# Patient Record
Sex: MALE | Race: WHITE | NOT HISPANIC OR LATINO | Employment: UNEMPLOYED | URBAN - NONMETROPOLITAN AREA
[De-identification: names, ages, dates, MRNs, and addresses within clinical notes are randomized per-mention and may not be internally consistent; named-entity substitution may affect disease eponyms.]

---

## 2017-07-11 ENCOUNTER — OFFICE VISIT (OUTPATIENT)
Dept: ORTHOPEDIC SURGERY | Facility: CLINIC | Age: 47
End: 2017-07-11

## 2017-07-11 VITALS — BODY MASS INDEX: 23.1 KG/M2 | HEIGHT: 74 IN | WEIGHT: 180 LBS

## 2017-07-11 DIAGNOSIS — M54.2 CERVICALGIA: Primary | ICD-10-CM

## 2017-07-11 DIAGNOSIS — M79.2 RADICULAR PAIN OF LEFT UPPER EXTREMITY: ICD-10-CM

## 2017-07-11 PROCEDURE — 99203 OFFICE O/P NEW LOW 30 MIN: CPT | Performed by: NURSE PRACTITIONER

## 2017-07-11 PROCEDURE — 96372 THER/PROPH/DIAG INJ SC/IM: CPT | Performed by: NURSE PRACTITIONER

## 2017-07-11 RX ORDER — TRIAMCINOLONE ACETONIDE 40 MG/ML
60 INJECTION, SUSPENSION INTRA-ARTICULAR; INTRAMUSCULAR ONCE
Status: COMPLETED | OUTPATIENT
Start: 2017-07-11 | End: 2017-07-11

## 2017-07-11 RX ORDER — FEXOFENADINE HCL 180 MG/1
TABLET ORAL
Refills: 1 | COMMUNITY
Start: 2017-06-07

## 2017-07-11 RX ORDER — CYCLOBENZAPRINE HCL 10 MG
TABLET ORAL
COMMUNITY
Start: 2017-07-08 | End: 2018-06-06

## 2017-07-11 RX ORDER — FLUTICASONE PROPIONATE 50 UG/1
SPRAY, METERED NASAL
Refills: 5 | COMMUNITY
Start: 2017-06-12

## 2017-07-11 RX ORDER — MELATONIN
Refills: 3 | COMMUNITY
Start: 2017-06-12

## 2017-07-11 RX ORDER — METHYLPREDNISOLONE 4 MG/1
1 TABLET ORAL DAILY
Qty: 21 TABLET | Refills: 0 | Status: SHIPPED | OUTPATIENT
Start: 2017-07-11 | End: 2018-05-21

## 2017-07-11 RX ORDER — NAPROXEN 500 MG/1
TABLET ORAL
COMMUNITY
Start: 2017-07-08 | End: 2018-05-21

## 2017-07-11 RX ORDER — KETOROLAC TROMETHAMINE 30 MG/ML
60 INJECTION, SOLUTION INTRAMUSCULAR; INTRAVENOUS ONCE
Status: COMPLETED | OUTPATIENT
Start: 2017-07-11 | End: 2017-07-11

## 2017-07-11 RX ORDER — GEMFIBROZIL 600 MG/1
TABLET, FILM COATED ORAL
COMMUNITY
Start: 2017-07-08

## 2017-07-11 RX ADMIN — TRIAMCINOLONE ACETONIDE 60 MG: 40 INJECTION, SUSPENSION INTRA-ARTICULAR; INTRAMUSCULAR at 15:30

## 2017-07-11 RX ADMIN — KETOROLAC TROMETHAMINE 60 MG: 30 INJECTION, SOLUTION INTRAMUSCULAR; INTRAVENOUS at 15:30

## 2017-07-11 NOTE — PROGRESS NOTES
Bertram Toscano is a 47 y.o. male   Primary provider:  LISA Ho       Chief Complaint   Patient presents with   • Cervical Spine - Establish Care     Xray today       HISTORY OF PRESENT ILLNESS:    HPI Comments: Complains of neck pain with pain that radiates down into his left arm with numbness and tingling.  Similar episode last year with pain that radiated into bilateral arms that spontaneously resolved.  Denies any known injury.  Pain is worse with movement of his neck.  Pain is described as burning, stinging.  Pain improves when he raises his left arm to his head. No improvement with NSAIDs.     Neck Pain    This is a recurrent problem. Episode onset: 2 months. The problem occurs constantly. The problem has been gradually worsening. The pain is associated with nothing. The pain is present in the left side. The quality of the pain is described as aching and burning. The pain is at a severity of 5/10. The pain is mild. The symptoms are aggravated by position. Associated symptoms include numbness, photophobia, tingling and weakness. He has tried NSAIDs, oral narcotics and muscle relaxants for the symptoms. The treatment provided no relief.        CONCURRENT MEDICAL HISTORY:    Past Medical History:   Diagnosis Date   • Asthma        No Known Allergies      Current Outpatient Prescriptions:   •  BREO ELLIPTA 200-25 MCG/INH aerosol powder , INAHLE 1 PUFF ONCE DAILY, Disp: , Rfl: 3  •  cholecalciferol (VITAMIN D3) 1000 UNITS tablet, TAKE 1 TABLET BY MOUTH EVERY MORNING, Disp: , Rfl: 3  •  cyclobenzaprine (FLEXERIL) 10 MG tablet, , Disp: , Rfl:   •  fexofenadine (ALLEGRA) 180 MG tablet, TAKE 1 TABLET BY MOUTH ONCE DAILY, Disp: , Rfl: 1  •  FLONASE ALLERGY RELIEF 50 MCG/ACT nasal spray, USE 1 SPRAY IN EACH NOSTRIL DAILY, Disp: , Rfl: 5  •  gemfibrozil (LOPID) 600 MG tablet, , Disp: , Rfl:   •  naproxen (NAPROSYN) 500 MG tablet, , Disp: , Rfl:   •  MethylPREDNISolone (MEDROL, NOBLE,) 4 MG tablet, Take 1  "tablet by mouth Daily. Use as directed by package instructions, Disp: 21 tablet, Rfl: 0  No current facility-administered medications for this visit.     Past Surgical History:   Procedure Laterality Date   • PROSTATE BIOPSY         Family History   Problem Relation Age of Onset   • Heart disease Mother    • Hypertension Father    • Heart disease Father    • Diabetes Father         Social History     Social History   • Marital status: Single     Spouse name: N/A   • Number of children: N/A   • Years of education: N/A     Occupational History   • Not on file.     Social History Main Topics   • Smoking status: Current Every Day Smoker     Packs/day: 1.00     Types: Cigarettes   • Smokeless tobacco: Not on file   • Alcohol use Yes      Comment: light    • Drug use: No   • Sexual activity: Not on file     Other Topics Concern   • Not on file     Social History Narrative   • No narrative on file        Review of Systems   Eyes: Positive for photophobia.   Musculoskeletal: Positive for arthralgias, back pain and neck pain.        Left arm pain.    Neurological: Positive for tingling, weakness and numbness.   All other systems reviewed and are negative.      PHYSICAL EXAMINATION:       Ht 74\" (188 cm)  Wt 180 lb (81.6 kg)  BMI 23.11 kg/m2    Physical Exam   Constitutional: He is oriented to person, place, and time. Vital signs are normal. He appears well-developed and well-nourished.   HENT:   Head: Normocephalic.   Pulmonary/Chest: Effort normal. No respiratory distress.   Abdominal: Soft. He exhibits no distension.   Neurological: He is alert and oriented to person, place, and time. GCS eye subscore is 4. GCS verbal subscore is 5. GCS motor subscore is 6.   Skin: Skin is warm, dry and intact.   Psychiatric: He has a normal mood and affect. His speech is normal and behavior is normal. Judgment and thought content normal. Cognition and memory are normal.   Vitals reviewed.      GAIT:     [x]  Normal  []  " Antalgic    Assistive device: [x]  None  []  Walker     []  Crutches  []  Cane     []  Wheelchair  []  Stretcher    Back Exam     Tenderness   The patient is experiencing tenderness in the cervical.    Range of Motion   Extension: abnormal   Flexion: abnormal   Lateral Bend Right: abnormal   Lateral Bend Left: normal   Rotation Right: abnormal   Rotation Left: normal     Muscle Strength   The patient has normal back strength.    Tests   Straight leg raise right: negative  Straight leg raise left: negative    Reflexes   Biceps: normal    Other   Sensation: normal  Gait: normal   Erythema: no back redness  Scars: absent    Comments:  Increased pain with flexion, extension and right rotation of neck.               Xr Spine Cervical 2 Or 3 View    Result Date: 7/11/2017  Narrative: 2 views of the cervical spine reveal degenerative changes at the levels of C5-C6 and C6-C7 with arthritic changes and anterior spurring.  No acute radiologic abnormalities are noted at this time.07/11/17 at 3:44 PM by LISA Kim       ASSESSMENT:    Diagnoses and all orders for this visit:    Cervicalgia  -     XR Spine Cervical 2 or 3 View  -     MRI Cervical Spine Without Contrast; Future  -     triamcinolone acetonide (KENALOG-40) injection 60 mg; Inject 1.5 mL into the shoulder, thigh, or buttocks 1 (One) Time.  -     Ketorolac (Toradol) injection 60 mg.; Inject 2 mL into shoulder, thigh or buttocks 1 (One) Time.     Radicular pain of left upper extremity  -     MRI Cervical Spine Without Contrast; Future  -     triamcinolone acetonide (KENALOG-40) injection 60 mg; Inject 1.5 mL into the shoulder, thigh, or buttocks 1 (One) Time.        -     Ketorolac (Toradol) injection 60 mg.; Inject 2 mL into shoulder, thigh or buttocks 1 (One) Time.     Other orders  -     cyclobenzaprine (FLEXERIL) 10 MG tablet;   -     naproxen (NAPROSYN) 500 MG tablet;   -     MethylPREDNISolone (MEDROL, NOBLE,) 4 MG tablet; Take 1 tablet by mouth Daily.  Use as directed by package instructions    PLAN    X-rays of cervical spine reviewed today.  Recommend MRI of cervical spine based on recurrent pain with severe radicular symptoms down the left arm to rule out disc protrusion and nerve compression.  Recommend Medrol Dosepak.  Recommend IM injections of Toradol and Kenalog today in office for pain management.  Continue naproxen and Flexeril, as previously prescribed.  Recommend activity modification, based on pain.  Follow-up after MRI.    Return after MRI.    Jennifer Piña, APRN

## 2017-07-26 ENCOUNTER — HOSPITAL ENCOUNTER (OUTPATIENT)
Dept: MRI IMAGING | Facility: HOSPITAL | Age: 47
Discharge: HOME | End: 2017-07-26

## 2017-07-26 DIAGNOSIS — M54.2 CERVICALGIA: ICD-10-CM

## 2017-07-26 DIAGNOSIS — M79.2 RADICULAR PAIN OF LEFT UPPER EXTREMITY: ICD-10-CM

## 2017-08-02 ENCOUNTER — APPOINTMENT (OUTPATIENT)
Dept: MRI IMAGING | Facility: HOSPITAL | Age: 47
End: 2017-08-02

## 2017-08-11 ENCOUNTER — APPOINTMENT (OUTPATIENT)
Dept: MRI IMAGING | Facility: HOSPITAL | Age: 47
End: 2017-08-11

## 2017-08-24 ENCOUNTER — HOSPITAL ENCOUNTER (OUTPATIENT)
Dept: MRI IMAGING | Facility: HOSPITAL | Age: 47
Discharge: HOME | End: 2017-08-24
Admitting: NURSE PRACTITIONER

## 2017-08-24 PROCEDURE — 72141 MRI NECK SPINE W/O DYE: CPT

## 2017-08-28 ENCOUNTER — OFFICE VISIT (OUTPATIENT)
Dept: ORTHOPEDIC SURGERY | Facility: CLINIC | Age: 47
End: 2017-08-28

## 2017-08-28 VITALS — BODY MASS INDEX: 23.1 KG/M2 | HEIGHT: 74 IN | WEIGHT: 180 LBS

## 2017-08-28 DIAGNOSIS — M54.2 CERVICALGIA: ICD-10-CM

## 2017-08-28 DIAGNOSIS — M79.2 RADICULAR PAIN OF LEFT UPPER EXTREMITY: ICD-10-CM

## 2017-08-28 DIAGNOSIS — M50.30 DEGENERATIVE DISC DISEASE, CERVICAL: Primary | ICD-10-CM

## 2017-08-28 PROCEDURE — 99214 OFFICE O/P EST MOD 30 MIN: CPT | Performed by: NURSE PRACTITIONER

## 2017-08-28 NOTE — PROGRESS NOTES
"Bertram Toscano is a 47 y.o. male returns for     Chief Complaint   Patient presents with   • Cervical Spine - Follow-up   • Results     MRI       HISTORY OF PRESENT ILLNESS: Presents to office for recheck of neck pain and MRI results of cervical spine. Pain improved after injections and oral course of steroids given at last visit but is gradually returning. Patient continues to have numbness of the left arm intermittently.        CONCURRENT MEDICAL HISTORY:    Past Medical History:   Diagnosis Date   • Asthma        No Known Allergies      Current Outpatient Prescriptions:   •  BREO ELLIPTA 100-25 MCG/INH aerosol powder , , Disp: , Rfl:   •  BREO ELLIPTA 200-25 MCG/INH aerosol powder , INAHLE 1 PUFF ONCE DAILY, Disp: , Rfl: 3  •  cholecalciferol (VITAMIN D3) 1000 UNITS tablet, TAKE 1 TABLET BY MOUTH EVERY MORNING, Disp: , Rfl: 3  •  cyclobenzaprine (FLEXERIL) 10 MG tablet, , Disp: , Rfl:   •  fexofenadine (ALLEGRA) 180 MG tablet, TAKE 1 TABLET BY MOUTH ONCE DAILY, Disp: , Rfl: 1  •  FLONASE ALLERGY RELIEF 50 MCG/ACT nasal spray, USE 1 SPRAY IN EACH NOSTRIL DAILY, Disp: , Rfl: 5  •  gemfibrozil (LOPID) 600 MG tablet, , Disp: , Rfl:   •  MethylPREDNISolone (MEDROL, NOBLE,) 4 MG tablet, Take 1 tablet by mouth Daily. Use as directed by package instructions, Disp: 21 tablet, Rfl: 0  •  naproxen (NAPROSYN) 500 MG tablet, , Disp: , Rfl:     Past Surgical History:   Procedure Laterality Date   • PROSTATE BIOPSY         ROS  No fevers or chills.  No chest pain or shortness of air.  No GI or  disturbances. Neck pain. Left arm numbness.     PHYSICAL EXAMINATION:       Ht 74\" (188 cm)  Wt 180 lb (81.6 kg)  BMI 23.11 kg/m2    Physical Exam   Constitutional: He is oriented to person, place, and time. Vital signs are normal. He appears well-developed and well-nourished.   HENT:   Head: Normocephalic.   Pulmonary/Chest: Effort normal. No respiratory distress.   Abdominal: Soft. He exhibits no distension.   Neurological: He is " alert and oriented to person, place, and time. GCS eye subscore is 4. GCS verbal subscore is 5. GCS motor subscore is 6.   Skin: Skin is warm, dry and intact.   Psychiatric: He has a normal mood and affect. His speech is normal and behavior is normal. Judgment and thought content normal. Cognition and memory are normal.   Vitals reviewed.      GAIT:     [x]  Normal  []  Antalgic    Assistive device: [x]  None  []  Walker     []  Crutches  []  Cane     []  Wheelchair  []  Stretcher    Back Exam     Tenderness   The patient is experiencing tenderness in the cervical.    Range of Motion   Extension: abnormal Back extension: cervical    Flexion: abnormal Back flexion: cervical.   Rotation Right: abnormal Back rotation right: cervical.   Rotation Left: normal     Muscle Strength   The patient has normal back strength.    Tests   Straight leg raise right: negative  Straight leg raise left: negative    Other   Sensation: decreased (left forearm)  Gait: normal   Erythema: no back redness  Scars: absent    Comments:  Increased pain with flexion, extension and right rotation of neck.             Mri Cervical Spine Without Contrast    Result Date: 8/24/2017  Narrative: EXAMINATION: MRI CERVICAL SPINE WO CONTRAST-  8/24/2017 12:48 PM CST  HISTORY: Cervicalgia. Radicular pain of left upper extremity  COMPARISON:None  TECHNIQUE:Multiplanar MR imaging of the cervical spine was performed.  The patient was very claustrophobic. Image quality related to patient motion observed.  FINDINGS:  There are no cord signal abnormalities.  There is decreased T1 signal identified diffusely in the spine. Question red cell conversion of bone marrow. Correlate for anemia or tobacco use.  DISC SPACE LEVELS:  C2-C3: Normal hydration observed within the disc. The bony spinal canal and foramina are capacious without stenosis. There is no disc bulge or protrusion.  C3-C4: Spondylosis changes identified with mild anterior and posterior osteophyte  formation. There is broad-based posterior osteophyte and disc complex with uncinate hypertrophy. There is relative mild narrowing of the bony spinal canal with mild ventral thecal sac and cord flattening with mild canal stenosis. There is mild to moderate bilateral foraminal narrowing. There are no focal disc protrusions or herniations. There is linear T2 signal hyperintensity extending into the bulging disc material compatible with an annular tear.  C4-C5: There is desiccation dysmature loss of normal T2 signal. There is diffuse disc bulging with mild central protrusion. There is mild thecal sac cord flattening particularly centrally without significant canal stenosis. There is uncinate hypertrophy with mild to moderate left-sided foraminal narrowing. The right foramina is patent without significant stenosis.  C5-C6: There is desiccation of disc material with disc space narrowing. There is type II Modic endplate changes. There is anterior and posterior osteophyte formation. There is broad-based posterior osteophyte and disc complex more prominent right paracentrally. There is resulting canal and thecal sac deformity again more prominent right paracentrally with mild canal stenosis. Uncinate hypertrophy. Mild bilateral foraminal narrowing observed greater on the left.  C6-C7: There is disc degeneration with desiccation of disc material with disc space narrowing. There is anterior posterior osteophyte formation. There is broad-based posterior osteophyte and disc complex with broad-based disc protrusion versus disc bulging. There is mild thecal sac narrowing cord flattening without significant canal stenosis. There is uncinate hypertrophy with moderate left and mild right-sided foraminal narrowing.      Impression: 1. Multilevel disc degeneration and spondylosis with relative canal or foraminal narrowing, described above in detail. This report was finalized on 08/24/2017 13:57 by Dr. Wisam Galarza  MD.        ASSESSMENT:    Diagnoses and all orders for this visit:    Cervicalgia    Radicular pain of left upper extremity    Degenerative disc disease, cervical    PLAN    MRI of cervical spine reviewed and results discussed with patient. Discussed treatment options today with patient, including referral to a spine surgeon or neurosurgeon. Patient states he would like to avoid surgery if at all possible. Recommend referral to pain management for cervical epidural injections. Patient is in agreement with this. Recommend to continue Naproxen and Flexeril as needed for pain. Continue with activity modification as tolerated, based on pain. Specifically, patient is instructed to avoid any heavy lifting, pulling or tugging. Patient verbalized understanding. Follow up 4-6 weeks post-injection.     Return 4-6 weeks post injection for recheck.    Jennifer Piña APRN

## 2017-08-31 DIAGNOSIS — M79.2 RADICULAR PAIN OF LEFT UPPER EXTREMITY: ICD-10-CM

## 2017-08-31 DIAGNOSIS — M54.2 CERVICALGIA: ICD-10-CM

## 2017-09-11 ENCOUNTER — HOSPITAL ENCOUNTER (OUTPATIENT)
Dept: INTERVENTIONAL RADIOLOGY/VASCULAR | Facility: HOSPITAL | Age: 47
Discharge: HOME | End: 2017-09-11
Attending: PAIN MEDICINE | Admitting: PAIN MEDICINE

## 2017-09-11 VITALS
DIASTOLIC BLOOD PRESSURE: 88 MMHG | RESPIRATION RATE: 18 BRPM | SYSTOLIC BLOOD PRESSURE: 150 MMHG | OXYGEN SATURATION: 98 % | HEART RATE: 80 BPM

## 2017-09-11 DIAGNOSIS — M54.2 CERVICALGIA: ICD-10-CM

## 2017-09-11 DIAGNOSIS — M79.2 RADICULAR PAIN OF LEFT UPPER EXTREMITY: ICD-10-CM

## 2017-09-11 PROCEDURE — 25010000002 METHYLPREDNISOLONE PER 80 MG: Performed by: PAIN MEDICINE

## 2017-09-11 PROCEDURE — 62321 NJX INTERLAMINAR CRV/THRC: CPT | Performed by: PAIN MEDICINE

## 2017-09-11 PROCEDURE — 0 IOPAMIDOL 41 % SOLUTION: Performed by: PAIN MEDICINE

## 2017-09-11 RX ORDER — METHYLPREDNISOLONE ACETATE 80 MG/ML
INJECTION, SUSPENSION INTRA-ARTICULAR; INTRALESIONAL; INTRAMUSCULAR; SOFT TISSUE
Status: COMPLETED | OUTPATIENT
Start: 2017-09-11 | End: 2017-09-11

## 2017-09-11 RX ORDER — LIDOCAINE HYDROCHLORIDE 20 MG/ML
INJECTION, SOLUTION INFILTRATION; PERINEURAL
Status: COMPLETED | OUTPATIENT
Start: 2017-09-11 | End: 2017-09-11

## 2017-09-11 RX ADMIN — LIDOCAINE HYDROCHLORIDE 2 ML: 20 INJECTION, SOLUTION INFILTRATION; PERINEURAL at 14:00

## 2017-09-11 RX ADMIN — IOPAMIDOL 2 ML: 408 INJECTION, SOLUTION INTRATHECAL at 14:00

## 2017-09-11 RX ADMIN — METHYLPREDNISOLONE ACETATE 80 MG: 80 INJECTION, SUSPENSION INTRA-ARTICULAR; INTRALESIONAL; INTRAMUSCULAR; SOFT TISSUE at 14:00

## 2018-05-09 ENCOUNTER — TRANSCRIBE ORDERS (OUTPATIENT)
Dept: ORTHOPEDIC SURGERY | Facility: CLINIC | Age: 48
End: 2018-05-09

## 2018-05-09 DIAGNOSIS — M54.2 NECK PAIN ON LEFT SIDE: Primary | ICD-10-CM

## 2018-05-21 ENCOUNTER — OFFICE VISIT (OUTPATIENT)
Dept: ORTHOPEDIC SURGERY | Facility: CLINIC | Age: 48
End: 2018-05-21

## 2018-05-21 VITALS — WEIGHT: 180 LBS | BODY MASS INDEX: 23.1 KG/M2 | HEIGHT: 74 IN

## 2018-05-21 DIAGNOSIS — M50.30 DEGENERATIVE DISC DISEASE, CERVICAL: ICD-10-CM

## 2018-05-21 DIAGNOSIS — M54.2 CERVICALGIA: Primary | ICD-10-CM

## 2018-05-21 PROCEDURE — 99214 OFFICE O/P EST MOD 30 MIN: CPT | Performed by: ORTHOPAEDIC SURGERY

## 2018-05-21 NOTE — PROGRESS NOTES
"Bertram Toscano is a 48 y.o. male returns for     Chief Complaint   Patient presents with   • Cervical Spine - Pain       HISTORY OF PRESENT ILLNESS: patient seen previously by Jennifer Piña was referred to Dr. Villagomez for epidural injection done on 9/11/2017, patient states that injection helped but is now starting to wear off. Patient has numbness in left arm.     47 y/o complaints of neck pain, numbness of the left arm.  The pain is constant, gradual, sharp.  Complains of numbness of the left arm.  The pain radiates to the left arm.  The pain is alleviated by previous epidural steroid injection.  No fevers no chills, no nighttime awakening pain.  + numbness of the left arm.        Smoker for 25 years      CONCURRENT MEDICAL HISTORY:    Past Medical History:   Diagnosis Date   • Asthma        No Known Allergies      Current Outpatient Prescriptions:   •  BREO ELLIPTA 100-25 MCG/INH aerosol powder , , Disp: , Rfl:   •  BREO ELLIPTA 200-25 MCG/INH aerosol powder , INAHLE 1 PUFF ONCE DAILY, Disp: , Rfl: 3  •  cholecalciferol (VITAMIN D3) 1000 UNITS tablet, TAKE 1 TABLET BY MOUTH EVERY MORNING, Disp: , Rfl: 3  •  cyclobenzaprine (FLEXERIL) 10 MG tablet, , Disp: , Rfl:   •  fexofenadine (ALLEGRA) 180 MG tablet, TAKE 1 TABLET BY MOUTH ONCE DAILY, Disp: , Rfl: 1  •  FLONASE ALLERGY RELIEF 50 MCG/ACT nasal spray, USE 1 SPRAY IN EACH NOSTRIL DAILY, Disp: , Rfl: 5  •  gemfibrozil (LOPID) 600 MG tablet, , Disp: , Rfl:     Past Surgical History:   Procedure Laterality Date   • PROSTATE BIOPSY         ROS  No fevers or chills.  No chest pain or shortness of air.  No GI or  disturbances.    PHYSICAL EXAMINATION:       Ht 188 cm (74\")   Wt 81.6 kg (180 lb)   BMI 23.11 kg/m²     Physical Exam   Constitutional: He appears well-developed. No distress.   Smells of cigarette smoke.     HENT:   Head: Normocephalic and atraumatic.   Eyes: Pupils are equal, round, and reactive to light. Right eye exhibits no discharge. Left eye " exhibits no discharge.   Neck: Normal range of motion. No JVD present. No tracheal deviation present. No thyromegaly present.   Cardiovascular: Normal rate and intact distal pulses.    Pulmonary/Chest: Effort normal and breath sounds normal. No respiratory distress. He has no wheezes. He has no rales. He exhibits no tenderness.   Abdominal: Soft. Bowel sounds are normal. He exhibits no distension. There is no tenderness. There is no guarding.   Musculoskeletal: He exhibits no edema, tenderness or deformity.   Lymphadenopathy:     He has no cervical adenopathy.   Neurological: He is alert. He has normal reflexes. He displays normal reflexes. No cranial nerve deficit. Coordination normal.   Skin: Skin is warm. No rash noted. No erythema.   Psychiatric: He has a normal mood and affect. His behavior is normal. Thought content normal.       GAIT:     [x]  Normal  []  Antalgic    Assistive device: [x]  None  []  Walker     []  Crutches  []  Cane     []  Wheelchair  []  Stretcher    Ortho Exam  Cervical flexion 30 cervical extension 10  Lateral rotation 20 right and left  + spurling maneuver  + pain with palpation of the midcervical region.   Shoulder abduction and range of motion is normal.        2 views of the cervical spine reveal degenerative changes at the levels of C5-C6 and C6-C7 with arthritic changes and anterior spurring.  No acute radiologic abnormalities are noted at this time.07/11/17 at 3:44 PM by LISA Kim    EXAMINATION: MRI CERVICAL SPINE WO CONTRAST-  8/24/2017 12:48 PM CST     HISTORY: Cervicalgia. Radicular pain of left upper extremity     COMPARISON:None     TECHNIQUE:Multiplanar MR imaging of the cervical spine was performed.     The patient was very claustrophobic. Image quality related to patient  motion observed.     FINDINGS:     There are no cord signal abnormalities.     There is decreased T1 signal identified diffusely in the spine. Question  red cell conversion of bone marrow.  Correlate for anemia or tobacco use.     DISC SPACE LEVELS:     C2-C3: Normal hydration observed within the disc. The bony spinal canal  and foramina are capacious without stenosis. There is no disc bulge or  protrusion.     C3-C4: Spondylosis changes identified with mild anterior and posterior  osteophyte formation. There is broad-based posterior osteophyte and disc  complex with uncinate hypertrophy. There is relative mild narrowing of  the bony spinal canal with mild ventral thecal sac and cord flattening  with mild canal stenosis. There is mild to moderate bilateral foraminal  narrowing. There are no focal disc protrusions or herniations. There is  linear T2 signal hyperintensity extending into the bulging disc material  compatible with an annular tear.     C4-C5: There is desiccation dysmature loss of normal T2 signal. There is  diffuse disc bulging with mild central protrusion. There is mild thecal  sac cord flattening particularly centrally without significant canal  stenosis. There is uncinate hypertrophy with mild to moderate left-sided  foraminal narrowing. The right foramina is patent without significant  stenosis.     C5-C6: There is desiccation of disc material with disc space narrowing.  There is type II Modic endplate changes. There is anterior and posterior  osteophyte formation. There is broad-based posterior osteophyte and disc  complex more prominent right paracentrally. There is resulting canal and  thecal sac deformity again more prominent right paracentrally with mild  canal stenosis. Uncinate hypertrophy. Mild bilateral foraminal narrowing  observed greater on the left.     C6-C7: There is disc degeneration with desiccation of disc material with  disc space narrowing. There is anterior posterior osteophyte formation.  There is broad-based posterior osteophyte and disc complex with  broad-based disc protrusion versus disc bulging. There is mild thecal  sac narrowing cord flattening without  significant canal stenosis. There  is uncinate hypertrophy with moderate left and mild right-sided  foraminal narrowing.     IMPRESSION:  1. Multilevel disc degeneration and spondylosis with relative canal or  foraminal narrowing, described above in detail.  This report was finalized on 08/24/2017 13:57 by Dr. Wisam Galarza MD.    I reviewed MRI of the cervical spine 8/24/17, there is cervical degenerative disc disease         ASSESSMENT:    Diagnoses and all orders for this visit:    Cervicalgia    Degenerative disc disease, cervical  -     IR Cervical Thoracic Trnfrmnl Epdl Injection Left; Future    Other orders  -     Obtain informed consent; Future          PLAN    I  Discussed with him treatment options, he would like to proceed for cervical epidural steroid injection.  I reviewed with him the risks including infection, hematoma, spinal fluid leak, neurologic symptoms including numbness and weakness and possible worsening of pain.          Sudheer Abraham MD

## 2018-06-06 ENCOUNTER — HOSPITAL ENCOUNTER (OUTPATIENT)
Dept: INTERVENTIONAL RADIOLOGY/VASCULAR | Facility: HOSPITAL | Age: 48
Discharge: HOME | End: 2018-06-06
Admitting: ORTHOPAEDIC SURGERY

## 2018-06-06 VITALS
HEIGHT: 74 IN | TEMPERATURE: 97.1 F | OXYGEN SATURATION: 98 % | BODY MASS INDEX: 22.97 KG/M2 | DIASTOLIC BLOOD PRESSURE: 98 MMHG | HEART RATE: 71 BPM | RESPIRATION RATE: 20 BRPM | WEIGHT: 179.01 LBS | SYSTOLIC BLOOD PRESSURE: 152 MMHG

## 2018-06-06 DIAGNOSIS — M50.30 DEGENERATIVE DISC DISEASE, CERVICAL: ICD-10-CM

## 2018-06-06 PROCEDURE — 62324 NJX INTERLAMINAR CRV/THRC: CPT | Performed by: ORTHOPAEDIC SURGERY

## 2018-06-06 PROCEDURE — 0 IOPAMIDOL 41 % SOLUTION: Performed by: ORTHOPAEDIC SURGERY

## 2018-06-06 PROCEDURE — 25010000002 METHYLPREDNISOLONE PER 80 MG: Performed by: ORTHOPAEDIC SURGERY

## 2018-06-06 RX ORDER — BUPIVACAINE HYDROCHLORIDE 5 MG/ML
INJECTION, SOLUTION EPIDURAL; INTRACAUDAL
Status: COMPLETED | OUTPATIENT
Start: 2018-06-06 | End: 2018-06-06

## 2018-06-06 RX ORDER — MONTELUKAST SODIUM 10 MG/1
10 TABLET ORAL NIGHTLY
COMMUNITY

## 2018-06-06 RX ORDER — METHYLPREDNISOLONE ACETATE 80 MG/ML
INJECTION, SUSPENSION INTRA-ARTICULAR; INTRALESIONAL; INTRAMUSCULAR; SOFT TISSUE
Status: COMPLETED | OUTPATIENT
Start: 2018-06-06 | End: 2018-06-06

## 2018-06-06 RX ADMIN — IOPAMIDOL 5 ML: 408 INJECTION, SOLUTION INTRATHECAL at 13:29

## 2018-06-06 RX ADMIN — BUPIVACAINE HYDROCHLORIDE 10 ML: 5 INJECTION, SOLUTION EPIDURAL; INTRACAUDAL; PERINEURAL at 13:28

## 2018-06-06 RX ADMIN — METHYLPREDNISOLONE ACETATE 80 MG: 80 INJECTION, SUSPENSION INTRA-ARTICULAR; INTRALESIONAL; INTRAMUSCULAR; SOFT TISSUE at 13:29

## 2019-07-01 ENCOUNTER — OFFICE VISIT (OUTPATIENT)
Dept: ORTHOPEDIC SURGERY | Facility: CLINIC | Age: 49
End: 2019-07-01

## 2019-07-01 VITALS — BODY MASS INDEX: 22.97 KG/M2 | HEIGHT: 74 IN | WEIGHT: 179 LBS

## 2019-07-01 DIAGNOSIS — M79.2 RADICULAR PAIN OF LEFT UPPER EXTREMITY: ICD-10-CM

## 2019-07-01 DIAGNOSIS — M50.30 DEGENERATIVE DISC DISEASE, CERVICAL: Primary | ICD-10-CM

## 2019-07-01 DIAGNOSIS — M54.2 CERVICALGIA: ICD-10-CM

## 2019-07-01 PROCEDURE — 99213 OFFICE O/P EST LOW 20 MIN: CPT | Performed by: ORTHOPAEDIC SURGERY

## 2019-07-01 RX ORDER — NABUMETONE 750 MG/1
750 TABLET, FILM COATED ORAL 2 TIMES DAILY
Qty: 60 TABLET | Refills: 2 | Status: SHIPPED | OUTPATIENT
Start: 2019-07-01

## 2019-07-01 RX ORDER — CYCLOBENZAPRINE HCL 10 MG
10 TABLET ORAL 3 TIMES DAILY PRN
Qty: 60 TABLET | Refills: 0 | Status: SHIPPED | OUTPATIENT
Start: 2019-07-01

## 2019-07-01 NOTE — PROGRESS NOTES
Bertram Toscano is a 49 y.o. male returns for     Chief Complaint   Patient presents with   • Cervical Spine - Follow-up, Pain     Bertram Toscano is a 49 y.o. male returns for     Chief Complaint   Patient presents with   • Cervical Spine - Follow-up, Pain       HISTORY OF PRESENT ILLNESS: patient seen previously by Jennifer Piña was referred to Dr. Villagomez for epidural injection done on 9/11/2017, patient states that injection helped but is now starting to wear off. Patient has numbness in left arm.     49 y/o complaints of neck pain, numbness of the left arm.  The pain is constant, gradual, sharp.  Complains of numbness of the left arm.  The pain radiates to the left arm.  The pain is not alleviated by previous epidural steroid injection.  No fevers no chills, no nighttime awakening pain.  + numbness of the left arm.  Complains of radiating arm pain, right and left sides, symptoms occur intermittently, worse   Continues to smoke    Smoker for 25 years      CONCURRENT MEDICAL HISTORY:    Past Medical History:   Diagnosis Date   • Asthma        No Known Allergies      Current Outpatient Medications:   •  BREO ELLIPTA 100-25 MCG/INH aerosol powder , , Disp: , Rfl:   •  cholecalciferol (VITAMIN D3) 1000 UNITS tablet, TAKE 1 TABLET BY MOUTH EVERY MORNING, Disp: , Rfl: 3  •  cyclobenzaprine (FLEXERIL) 10 MG tablet, Take 1 tablet by mouth 3 (Three) Times a Day As Needed for Muscle Spasms., Disp: 60 tablet, Rfl: 0  •  fexofenadine (ALLEGRA) 180 MG tablet, TAKE 1 TABLET BY MOUTH ONCE DAILY, Disp: , Rfl: 1  •  FLONASE ALLERGY RELIEF 50 MCG/ACT nasal spray, USE 1 SPRAY IN EACH NOSTRIL DAILY, Disp: , Rfl: 5  •  gemfibrozil (LOPID) 600 MG tablet, , Disp: , Rfl:   •  montelukast (SINGULAIR) 10 MG tablet, Take 10 mg by mouth Every Night., Disp: , Rfl:   •  nabumetone (RELAFEN) 750 MG tablet, Take 1 tablet by mouth 2 (Two) Times a Day., Disp: 60 tablet, Rfl: 2    Past Surgical History:   Procedure Laterality Date   • PROSTATE  "BIOPSY     • TONSILLECTOMY         ROS  No fevers or chills.  No chest pain or shortness of air.  No GI or  disturbances.    PHYSICAL EXAMINATION:       Ht 188 cm (74\")   Wt 81.2 kg (179 lb)   BMI 22.98 kg/m²     Physical Exam   Constitutional: He appears well-developed. No distress.   Smells of cigarette smoke.     HENT:   Head: Normocephalic and atraumatic.   Eyes: Pupils are equal, round, and reactive to light. Right eye exhibits no discharge. Left eye exhibits no discharge.   Neck: Normal range of motion. No JVD present. No tracheal deviation present. No thyromegaly present.   Cardiovascular: Normal rate and intact distal pulses.    Pulmonary/Chest: Effort normal and breath sounds normal. No respiratory distress. He has no wheezes. He has no rales. He exhibits no tenderness.   Abdominal: Soft. Bowel sounds are normal. He exhibits no distension. There is no tenderness. There is no guarding.   Musculoskeletal: He exhibits no edema, tenderness or deformity.   Lymphadenopathy:     He has no cervical adenopathy.   Neurological: He is alert. He has normal reflexes. He displays normal reflexes. No cranial nerve deficit. Coordination normal.   Skin: Skin is warm. No rash noted. No erythema.   Psychiatric: He has a normal mood and affect. His behavior is normal. Thought content normal.       GAIT:     [x]  Normal  []  Antalgic    Assistive device: [x]  None  []  Walker     []  Crutches  []  Cane     []  Wheelchair  []  Stretcher    Ortho Exam  Cervical flexion 30 cervical extension 10  Lateral rotation 20 right and left  +/- spurling maneuver  + pain with palpation of the midcervical region.   Shoulder abduction and range of motion is normal.    Ambulates normally  Smells of cigarette smoke.      2 views of the cervical spine reveal degenerative changes at the levels of C5-C6 and C6-C7 with arthritic changes and anterior spurring.  No acute radiologic abnormalities are noted at this time.07/11/17 at 3:44 PM by Jennifer" LISA Rivera    EXAMINATION: MRI CERVICAL SPINE WO CONTRAST-  8/24/2017 12:48 PM CST     HISTORY: Cervicalgia. Radicular pain of left upper extremity     COMPARISON:None     TECHNIQUE:Multiplanar MR imaging of the cervical spine was performed.     The patient was very claustrophobic. Image quality related to patient  motion observed.     FINDINGS:     There are no cord signal abnormalities.     There is decreased T1 signal identified diffusely in the spine. Question  red cell conversion of bone marrow. Correlate for anemia or tobacco use.     DISC SPACE LEVELS:     C2-C3: Normal hydration observed within the disc. The bony spinal canal  and foramina are capacious without stenosis. There is no disc bulge or  protrusion.     C3-C4: Spondylosis changes identified with mild anterior and posterior  osteophyte formation. There is broad-based posterior osteophyte and disc  complex with uncinate hypertrophy. There is relative mild narrowing of  the bony spinal canal with mild ventral thecal sac and cord flattening  with mild canal stenosis. There is mild to moderate bilateral foraminal  narrowing. There are no focal disc protrusions or herniations. There is  linear T2 signal hyperintensity extending into the bulging disc material  compatible with an annular tear.     C4-C5: There is desiccation dysmature loss of normal T2 signal. There is  diffuse disc bulging with mild central protrusion. There is mild thecal  sac cord flattening particularly centrally without significant canal  stenosis. There is uncinate hypertrophy with mild to moderate left-sided  foraminal narrowing. The right foramina is patent without significant  stenosis.     C5-C6: There is desiccation of disc material with disc space narrowing.  There is type II Modic endplate changes. There is anterior and posterior  osteophyte formation. There is broad-based posterior osteophyte and disc  complex more prominent right paracentrally. There is resulting canal  and  thecal sac deformity again more prominent right paracentrally with mild  canal stenosis. Uncinate hypertrophy. Mild bilateral foraminal narrowing  observed greater on the left.     C6-C7: There is disc degeneration with desiccation of disc material with  disc space narrowing. There is anterior posterior osteophyte formation.  There is broad-based posterior osteophyte and disc complex with  broad-based disc protrusion versus disc bulging. There is mild thecal  sac narrowing cord flattening without significant canal stenosis. There  is uncinate hypertrophy with moderate left and mild right-sided  foraminal narrowing.     IMPRESSION:  1. Multilevel disc degeneration and spondylosis with relative canal or  foraminal narrowing, described above in detail.  This report was finalized on 08/24/2017 13:57 by Dr. Wisam Galarza MD.    I reviewed MRI of the cervical spine 8/24/17, there is cervical degenerative disc disease present most identifiable C5-6 and C6-7.          ASSESSMENT:    Diagnoses and all orders for this visit:    Degenerative disc disease, cervical  -     MRI Cervical Spine Without Contrast; Future    Cervicalgia  -     MRI Cervical Spine Without Contrast; Future    Radicular pain of left upper extremity  -     MRI Cervical Spine Without Contrast; Future    Other orders  -     nabumetone (RELAFEN) 750 MG tablet; Take 1 tablet by mouth 2 (Two) Times a Day.  -     cyclobenzaprine (FLEXERIL) 10 MG tablet; Take 1 tablet by mouth 3 (Three) Times a Day As Needed for Muscle Spasms.          PLAN    I reviewed with him the risks including infection, hematoma, spinal fluid leak, neurologic symptoms including numbness and weakness and possible worsening of pain.   Recommend MRI of cervical spine to assess cervical spondylosis, and possible disc herniation.  relafen is prescribed  Flexeril is prescribed, I reviewd with him possible side effects regarding oral antiinflammatory and muscle relaxant medications.    Xray  cervical spine      MD Sudheer Light MD

## 2019-07-25 DIAGNOSIS — M50.30 DEGENERATIVE DISC DISEASE, CERVICAL: Primary | ICD-10-CM

## 2019-07-26 ENCOUNTER — OFFICE VISIT (OUTPATIENT)
Dept: ORTHOPEDIC SURGERY | Facility: CLINIC | Age: 49
End: 2019-07-26

## 2019-07-26 ENCOUNTER — APPOINTMENT (OUTPATIENT)
Dept: MRI IMAGING | Facility: HOSPITAL | Age: 49
End: 2019-07-26

## 2019-07-26 VITALS — BODY MASS INDEX: 22.72 KG/M2 | HEIGHT: 74 IN | WEIGHT: 177 LBS

## 2019-07-26 DIAGNOSIS — M54.2 CERVICALGIA: ICD-10-CM

## 2019-07-26 DIAGNOSIS — M79.2 RADICULAR PAIN OF LEFT UPPER EXTREMITY: ICD-10-CM

## 2019-07-26 DIAGNOSIS — M50.30 DEGENERATIVE DISC DISEASE, CERVICAL: Primary | ICD-10-CM

## 2019-07-26 PROCEDURE — 99213 OFFICE O/P EST LOW 20 MIN: CPT | Performed by: ORTHOPAEDIC SURGERY

## 2019-07-26 NOTE — PROGRESS NOTES
"Bertram Toscano is a 49 y.o. male returns for     Chief Complaint   Patient presents with   • Cervical Spine - Follow-up, Pain   • Results       HISTORY OF PRESENT ILLNESS: f/u neck pain, mri done on 7/23/2019 @ [facility].       49 y complains of burning pain down both arms, right arm is worse, this is a burning type pain.   Complains of neck pain, states pain is 10/10 in intensity at times, other times the pain is 5/10.    No fevers, no chills  No nighttime awakening pain.       CONCURRENT MEDICAL HISTORY:    The following portions of the patient's history were reviewed and updated as appropriate: allergies, current medications, past family history, past medical history, past social history, past surgical history and problem list.     ROS  No fevers or chills.  No chest pain or shortness of air.  No GI or  disturbances.    PHYSICAL EXAMINATION:       Ht 188 cm (74\")   Wt 80.3 kg (177 lb)   BMI 22.73 kg/m²     Physical Exam   Constitutional: He appears well-developed. No distress.   Smells of cigarette smoke.     HENT:   Head: Normocephalic and atraumatic.   Eyes: Pupils are equal, round, and reactive to light. Right eye exhibits no discharge. Left eye exhibits no discharge.   Neck: Normal range of motion. No JVD present. No tracheal deviation present. No thyromegaly present.   Cardiovascular: Normal rate and intact distal pulses.   Pulmonary/Chest: Effort normal and breath sounds normal. No respiratory distress. He has no wheezes. He has no rales. He exhibits no tenderness.   Abdominal: Soft. Bowel sounds are normal. He exhibits no distension. There is no tenderness. There is no guarding.   Musculoskeletal: He exhibits no edema, tenderness or deformity.   Lymphadenopathy:     He has no cervical adenopathy.   Neurological: He is alert. He has normal reflexes. He displays normal reflexes. No cranial nerve deficit. Coordination normal.   Skin: Skin is warm. No rash noted. No erythema.   Psychiatric: He " has a normal mood and affect. His behavior is normal. Thought content normal.       GAIT:     [x]  Normal  []  Antalgic    Assistive device: [x]  None  []  Walker     []  Crutches  []  Cane     []  Wheelchair  []  Stretcher    Ortho Exam     Cervical flexion 30 cervical extension 10  Lateral rotation 20 right and left  +/- spurling maneuver  + pain with palpation of the midcervical region.   Shoulder abduction and range of motion is normal.    Ambulates normally  Smells of cigarette smoke.         No results found.    Mri cspine 7/23/2019   Impression  No spinal stenosis or acute abnormality  Degenerative related high grade right foraminal narrowing at at c4-5 with likely compression of the right c5 nerve root.  Kevin. Foraminal narrowing at the c5-6 that is greater on the left. The impact on the left c6 nerve root is uncertain and needs clinical correlation  High grade left foraminal narrowing at the c6-7 with probable compression on the left c7 nerve root.     I personally reviewed the MRI images of cervical spine performed 7/23/2019, there is discogenic disease C4-5, C5-6 and C6-7, foraminal stenosis of the left C6-7 severe, cervical foraminal stenosis of C4-5 and C5-6          ASSESSMENT:    Diagnoses and all orders for this visit:    Degenerative disc disease, cervical    Cervicalgia    Radicular pain of left upper extremity          PLAN    I discussed with him treatment options including non operative treatment, likely will need 3 level cervical fusion, and I reviewed with him the operative technique.  I discussed surgical outcomes and recovery.       Sudheer Abraham MD

## 2019-07-29 DIAGNOSIS — M50.30 DEGENERATIVE DISC DISEASE, CERVICAL: ICD-10-CM

## 2019-07-29 DIAGNOSIS — M54.2 CERVICALGIA: ICD-10-CM

## 2019-07-29 DIAGNOSIS — M79.2 RADICULAR PAIN OF LEFT UPPER EXTREMITY: ICD-10-CM
